# Patient Record
Sex: MALE | Race: BLACK OR AFRICAN AMERICAN | NOT HISPANIC OR LATINO | URBAN - NONMETROPOLITAN AREA
[De-identification: names, ages, dates, MRNs, and addresses within clinical notes are randomized per-mention and may not be internally consistent; named-entity substitution may affect disease eponyms.]

---

## 2024-09-11 ENCOUNTER — LAB OUTSIDE AN ENCOUNTER (OUTPATIENT)
Dept: URBAN - NONMETROPOLITAN AREA CLINIC 4 | Facility: CLINIC | Age: 44
End: 2024-09-11

## 2024-09-11 ENCOUNTER — OFFICE VISIT (OUTPATIENT)
Dept: URBAN - NONMETROPOLITAN AREA CLINIC 4 | Facility: CLINIC | Age: 44
End: 2024-09-11
Payer: COMMERCIAL

## 2024-09-11 ENCOUNTER — DASHBOARD ENCOUNTERS (OUTPATIENT)
Age: 44
End: 2024-09-11

## 2024-09-11 VITALS
TEMPERATURE: 98.7 F | BODY MASS INDEX: 49.44 KG/M2 | SYSTOLIC BLOOD PRESSURE: 141 MMHG | HEIGHT: 67 IN | DIASTOLIC BLOOD PRESSURE: 81 MMHG | WEIGHT: 315 LBS | HEART RATE: 79 BPM

## 2024-09-11 DIAGNOSIS — R10.10 UPPER ABDOMINAL PAIN: ICD-10-CM

## 2024-09-11 DIAGNOSIS — R68.81 EARLY SATIETY: ICD-10-CM

## 2024-09-11 DIAGNOSIS — K21.9 CHRONIC GERD: ICD-10-CM

## 2024-09-11 DIAGNOSIS — K58.1 IRRITABLE BOWEL SYNDROME WITH CONSTIPATION: ICD-10-CM

## 2024-09-11 PROBLEM — 238136002: Status: ACTIVE | Noted: 2024-09-11

## 2024-09-11 PROBLEM — 440630006: Status: ACTIVE | Noted: 2024-09-11

## 2024-09-11 PROBLEM — 235595009: Status: ACTIVE | Noted: 2024-09-11

## 2024-09-11 PROBLEM — 441530006: Status: ACTIVE | Noted: 2024-09-11

## 2024-09-11 PROCEDURE — 99204 OFFICE O/P NEW MOD 45 MIN: CPT | Performed by: REGISTERED NURSE

## 2024-09-11 RX ORDER — LINACLOTIDE 290 UG/1
1 CAPSULE AT LEAST 30 MINUTES BEFORE THE FIRST MEAL OF THE DAY ON AN EMPTY STOMACH CAPSULE, GELATIN COATED ORAL ONCE A DAY
Qty: 30 | Refills: 1 | OUTPATIENT
Start: 2024-09-11 | End: 2024-11-09

## 2024-09-11 RX ORDER — OMEPRAZOLE 40 MG/1
1 CAPSULE 30 MINUTES BEFORE MORNING MEAL CAPSULE, DELAYED RELEASE ORAL ONCE A DAY
Qty: 30 | Refills: 1 | OUTPATIENT
Start: 2024-09-11

## 2024-09-11 NOTE — HPI-TODAY'S VISIT:
9/11/24: Pt is a 43 yo male with PMH of GERD, IBS with constipation, morbid obesity, chronic respiratory failure with hypoxia, MYA, chronic diastolic congestive heart failure, DM2, HTN who presents for evaluation of abdominal pain.   Pt reports chronic intermittent upper abdominal pain for several years. Has associated bloating and fullness. No aggravating factors. Previosuly followed by Dr. Roberts. Last cscope in ?2021 was normal. He also has chronic constipation. He has BMs every day, but has incomplete defecation. Denies hematochezia. He has taken Linzess in past which helped, but he ran out. CT scan in 2022 with no acute abnormalites. He used to take omeprazole for GERD, but ran out a month ago. He takes Tums prn. Has occasional nausea, but no vomiting. He stopped taking Ozempic due to SE's.

## 2024-10-16 ENCOUNTER — TELEPHONE ENCOUNTER (OUTPATIENT)
Dept: URBAN - NONMETROPOLITAN AREA CLINIC 4 | Facility: CLINIC | Age: 44
End: 2024-10-16

## 2024-10-22 ENCOUNTER — OFFICE VISIT (OUTPATIENT)
Dept: URBAN - METROPOLITAN AREA MEDICAL CENTER 24 | Facility: MEDICAL CENTER | Age: 44
End: 2024-10-22

## 2024-10-22 RX ORDER — OMEPRAZOLE 40 MG/1
1 CAPSULE 30 MINUTES BEFORE MORNING MEAL CAPSULE, DELAYED RELEASE ORAL ONCE A DAY
Qty: 30 | Refills: 1 | Status: ACTIVE | COMMUNITY
Start: 2024-09-11

## 2024-10-22 RX ORDER — LINACLOTIDE 290 UG/1
1 CAPSULE AT LEAST 30 MINUTES BEFORE THE FIRST MEAL OF THE DAY ON AN EMPTY STOMACH CAPSULE, GELATIN COATED ORAL ONCE A DAY
Qty: 30 | Refills: 1 | Status: ACTIVE | COMMUNITY
Start: 2024-09-11 | End: 2024-11-09

## 2024-10-23 ENCOUNTER — TELEPHONE ENCOUNTER (OUTPATIENT)
Dept: URBAN - NONMETROPOLITAN AREA CLINIC 4 | Facility: CLINIC | Age: 44
End: 2024-10-23

## 2024-11-11 ENCOUNTER — OFFICE VISIT (OUTPATIENT)
Dept: URBAN - NONMETROPOLITAN AREA CLINIC 4 | Facility: CLINIC | Age: 44
End: 2024-11-11
Payer: COMMERCIAL

## 2024-11-11 VITALS
SYSTOLIC BLOOD PRESSURE: 144 MMHG | HEIGHT: 67 IN | TEMPERATURE: 98.2 F | DIASTOLIC BLOOD PRESSURE: 75 MMHG | HEART RATE: 84 BPM | WEIGHT: 315 LBS | BODY MASS INDEX: 49.44 KG/M2

## 2024-11-11 DIAGNOSIS — E66.01 MORBID OBESITY: ICD-10-CM

## 2024-11-11 DIAGNOSIS — K21.9 CHRONIC GERD: ICD-10-CM

## 2024-11-11 DIAGNOSIS — K58.1 IRRITABLE BOWEL SYNDROME WITH CONSTIPATION: ICD-10-CM

## 2024-11-11 DIAGNOSIS — R14.0 ABDOMINAL BLOATING: ICD-10-CM

## 2024-11-11 DIAGNOSIS — I50.32 CHRONIC DIASTOLIC HEART FAILURE: ICD-10-CM

## 2024-11-11 DIAGNOSIS — K29.50 CHRONIC GASTRITIS WITHOUT BLEEDING, UNSPECIFIED GASTRITIS TYPE: ICD-10-CM

## 2024-11-11 PROBLEM — 8493009: Status: ACTIVE | Noted: 2024-11-11

## 2024-11-11 PROCEDURE — 99214 OFFICE O/P EST MOD 30 MIN: CPT | Performed by: REGISTERED NURSE

## 2024-11-11 RX ORDER — LINACLOTIDE 290 UG/1
1 CAPSULE AT LEAST 30 MINUTES BEFORE THE FIRST MEAL OF THE DAY ON AN EMPTY STOMACH CAPSULE, GELATIN COATED ORAL ONCE A DAY
Qty: 90 | Refills: 3 | OUTPATIENT
Start: 2024-11-11 | End: 2025-11-06

## 2024-11-11 RX ORDER — OMEPRAZOLE 40 MG/1
1 CAPSULE 30 MINUTES BEFORE MORNING MEAL CAPSULE, DELAYED RELEASE ORAL ONCE A DAY
Qty: 30 | Refills: 1 | Status: ACTIVE | COMMUNITY
Start: 2024-09-11

## 2024-11-11 RX ORDER — OMEPRAZOLE 40 MG/1
1 CAPSULE 30 MINUTES BEFORE MORNING MEAL CAPSULE, DELAYED RELEASE ORAL ONCE A DAY
Qty: 30 | Refills: 11

## 2024-11-11 NOTE — HPI-TODAY'S VISIT:
9/11/24: Pt is a 45 yo male with PMH of GERD, IBS with constipation, morbid obesity, chronic respiratory failure with hypoxia, MYA, chronic diastolic congestive heart failure, DM2, HTN who presents for evaluation of abdominal pain.   Pt reports chronic intermittent upper abdominal pain for several years. Has associated bloating and fullness. No aggravating factors. Previosuly followed by Dr. Roberts. Last cscope in ?2021 was normal. He also has chronic constipation. He has BMs every day, but has incomplete defecation. Denies hematochezia. He has taken Linzess in past which helped, but he ran out. CT scan in 2022 with no acute abnormalites. He used to take omeprazole for GERD, but ran out a month ago. He takes Tums prn. Has occasional nausea, but no vomiting. He stopped taking Ozempic due to SE's.  11/11/24: Pt RTC for f/u. Had EGD 10/24/24: - LA Grade A reflux esophagitis with no bleeding. Biopsied. - Acute gastritis, characterized by erythema. Biopsied. - Normal examined duodenum. Biopsied. Bx c/w moderate chronic gastritis, neg for HPY.  He continues to have abdominal pain, bloating and constipation. He admits to only taking Linzess on his off days (2 days out of the week). He has a BM every day, but has incomplete defecation. Reflux is well controlled on omeprazole.

## 2025-01-13 ENCOUNTER — OFFICE VISIT (OUTPATIENT)
Dept: URBAN - NONMETROPOLITAN AREA CLINIC 4 | Facility: CLINIC | Age: 45
End: 2025-01-13
Payer: COMMERCIAL

## 2025-01-13 VITALS — HEIGHT: 67 IN | WEIGHT: 315 LBS | TEMPERATURE: 98.2 F | BODY MASS INDEX: 49.44 KG/M2

## 2025-01-13 DIAGNOSIS — K29.50 CHRONIC GASTRITIS WITHOUT BLEEDING, UNSPECIFIED GASTRITIS TYPE: ICD-10-CM

## 2025-01-13 DIAGNOSIS — K58.1 IRRITABLE BOWEL SYNDROME WITH CONSTIPATION: ICD-10-CM

## 2025-01-13 DIAGNOSIS — I50.32 CHRONIC DIASTOLIC HEART FAILURE: ICD-10-CM

## 2025-01-13 DIAGNOSIS — E66.01 MORBID OBESITY: ICD-10-CM

## 2025-01-13 DIAGNOSIS — K21.9 CHRONIC GERD: ICD-10-CM

## 2025-01-13 DIAGNOSIS — R14.0 ABDOMINAL BLOATING: ICD-10-CM

## 2025-01-13 PROCEDURE — 99213 OFFICE O/P EST LOW 20 MIN: CPT | Performed by: REGISTERED NURSE

## 2025-01-13 RX ORDER — OMEPRAZOLE 40 MG/1
1 CAPSULE 30 MINUTES BEFORE MORNING MEAL CAPSULE, DELAYED RELEASE ORAL ONCE A DAY
Qty: 30 | Refills: 11 | Status: ACTIVE | COMMUNITY

## 2025-01-13 RX ORDER — LINACLOTIDE 290 UG/1
1 CAPSULE AT LEAST 30 MINUTES BEFORE THE FIRST MEAL OF THE DAY ON AN EMPTY STOMACH CAPSULE, GELATIN COATED ORAL ONCE A DAY
Qty: 90 | Refills: 3 | Status: ACTIVE | COMMUNITY
Start: 2024-11-11 | End: 2025-11-06

## 2025-01-13 NOTE — HPI-TODAY'S VISIT:
9/11/24: Pt is a 43 yo male with PMH of GERD, IBS with constipation, morbid obesity, chronic respiratory failure with hypoxia, MYA, chronic diastolic congestive heart failure, DM2, HTN who presents for evaluation of abdominal pain.   Pt reports chronic intermittent upper abdominal pain for several years. Has associated bloating and fullness. No aggravating factors. Previosuly followed by Dr. Roberts. Last cscope in ?2021 was normal. He also has chronic constipation. He has BMs every day, but has incomplete defecation. Denies hematochezia. He has taken Linzess in past which helped, but he ran out. CT scan in 2022 with no acute abnormalites. He used to take omeprazole for GERD, but ran out a month ago. He takes Tums prn. Has occasional nausea, but no vomiting. He stopped taking Ozempic due to SE's.  11/11/24: Pt RTC for f/u. Had EGD 10/24/24: - LA Grade A reflux esophagitis with no bleeding. Biopsied. - Acute gastritis, characterized by erythema. Biopsied. - Normal examined duodenum. Biopsied. Bx c/w moderate chronic gastritis, neg for HPY.  He continues to have abdominal pain, bloating and constipation. He admits to only taking Linzess on his off days (2 days out of the week). He has a BM every day, but has incomplete defecation. Reflux is well controlled on omeprazole.  1/13/25: Pt RTC for f/u. He has been taking Linzess every day and having BMs every day. No other GI complaints.